# Patient Record
Sex: FEMALE | Race: BLACK OR AFRICAN AMERICAN | URBAN - METROPOLITAN AREA
[De-identification: names, ages, dates, MRNs, and addresses within clinical notes are randomized per-mention and may not be internally consistent; named-entity substitution may affect disease eponyms.]

---

## 2019-01-09 PROBLEM — 66612000 NUTRITIONAL ANEMIA: Status: ACTIVE | Noted: 2019-01-09

## 2019-01-09 PROBLEM — 274774002 ELEVATED LEVELS OF TRANSAMINASE & LACTIC ACID DEHYDROGENASE: Status: ACTIVE | Noted: 2019-01-09

## 2020-01-10 PROBLEM — 19943007 CIRRHOSIS OF LIVER: Status: ACTIVE | Noted: 2020-01-10

## 2020-01-10 PROBLEM — 14223005 ESOPHAGEAL VARICES WITHOUT BLEEDING: Status: ACTIVE | Noted: 2020-01-10

## 2021-01-01 ENCOUNTER — OFFICE VISIT (OUTPATIENT)
Dept: URBAN - METROPOLITAN AREA CLINIC 84 | Facility: CLINIC | Age: 66
End: 2021-01-01
Payer: COMMERCIAL

## 2021-01-01 ENCOUNTER — OFFICE VISIT (OUTPATIENT)
Dept: URBAN - METROPOLITAN AREA CLINIC 29 | Facility: CLINIC | Age: 66
End: 2021-01-01

## 2021-01-01 ENCOUNTER — TELEPHONE ENCOUNTER (OUTPATIENT)
Dept: URBAN - METROPOLITAN AREA CLINIC 84 | Facility: CLINIC | Age: 66
End: 2021-01-01

## 2021-01-01 ENCOUNTER — WEB ENCOUNTER (OUTPATIENT)
Dept: URBAN - METROPOLITAN AREA CLINIC 84 | Facility: CLINIC | Age: 66
End: 2021-01-01

## 2021-01-01 ENCOUNTER — TELEPHONE ENCOUNTER (OUTPATIENT)
Dept: URBAN - METROPOLITAN AREA SURGERY CENTER 30 | Facility: SURGERY CENTER | Age: 66
End: 2021-01-01

## 2021-01-01 VITALS
DIASTOLIC BLOOD PRESSURE: 73 MMHG | BODY MASS INDEX: 26.81 KG/M2 | SYSTOLIC BLOOD PRESSURE: 110 MMHG | HEART RATE: 114 BPM | TEMPERATURE: 97.2 F | HEIGHT: 67 IN | WEIGHT: 170.8 LBS

## 2021-01-01 DIAGNOSIS — R11.0 NAUSEA: ICD-10-CM

## 2021-01-01 DIAGNOSIS — R18.8 ASCITES CONTROLLED WITH MEDICATION: ICD-10-CM

## 2021-01-01 DIAGNOSIS — N17.9 ACUTE RENAL FAILURE, UNSPECIFIED ACUTE RENAL FAILURE TYPE: ICD-10-CM

## 2021-01-01 DIAGNOSIS — K92.1 GASTROINTESTINAL HEMORRHAGE WITH MELENA: ICD-10-CM

## 2021-01-01 DIAGNOSIS — K74.69 CRYPTOGENIC CIRRHOSIS: ICD-10-CM

## 2021-01-01 DIAGNOSIS — K29.00 OTHER ACUTE GASTRITIS WITHOUT HEMORRHAGE: ICD-10-CM

## 2021-01-01 PROCEDURE — 99244 OFF/OP CNSLTJ NEW/EST MOD 40: CPT | Performed by: INTERNAL MEDICINE

## 2021-01-01 RX ORDER — SITAGLIPTIN 100 MG/1
1 TABLET TABLET, FILM COATED ORAL ONCE A DAY
Status: ON HOLD | COMMUNITY

## 2021-01-01 RX ORDER — TRAMADOL HYDROCHLORIDE 50 MG/1
1 TABLET AS NEEDED TABLET, FILM COATED ORAL ONCE A DAY
Status: ON HOLD | COMMUNITY

## 2021-01-01 RX ORDER — FUROSEMIDE 40 MG/1
1 TABLET TABLET ORAL ONCE A DAY
Status: ON HOLD | COMMUNITY

## 2021-01-01 RX ORDER — OMEPRAZOLE 40 MG/1
1 CAPSULE 30 MINUTES BEFORE MORNING MEAL CAPSULE, DELAYED RELEASE ORAL ONCE A DAY
Status: ON HOLD | COMMUNITY

## 2021-01-01 RX ORDER — ANASTROZOLE 1 MG/1
1 TABLET TABLET, FILM COATED ORAL ONCE A DAY
Status: ON HOLD | COMMUNITY

## 2021-01-01 RX ORDER — SPIRONOLACTONE 100 MG/1
1 TABLET TABLET, FILM COATED ORAL ONCE A DAY
Status: ON HOLD | COMMUNITY

## 2021-01-01 RX ORDER — ONDANSETRON HYDROCHLORIDE 4 MG/1
1 TABLET TABLET, FILM COATED ORAL EVERY 8 HOURS
Qty: 40 | Refills: 2 | OUTPATIENT
Start: 2021-01-01

## 2021-01-01 RX ORDER — AMLODIPINE BESYLATE 10 MG/1
1 TABLET TABLET ORAL ONCE A DAY
Status: ON HOLD | COMMUNITY

## 2021-04-01 PROBLEM — 271832001 FLATULENCE, ERUCTATION AND GAS PAIN: Status: ACTIVE | Noted: 2021-01-01

## 2021-04-01 PROBLEM — 414916001 OBESITY: Status: ACTIVE | Noted: 2021-01-01

## 2021-08-12 NOTE — HPI-TODAY'S VISIT:
The patient was referred by Dr. Yana Nye for cirrhosis/nausea .   A copy of this document is being forwarded to the referring provider.  She was recently diagnosed with cirrhosis.  She was told this is due to "fatty liver." She denies prior heavy EtOH use.  She was told she is negative for Hep B/Hep C.  She has history of breast cancer and did get chemo for this.  She has beenhahving ascites.  Per the patient she has had multiple hospitalizations over the past few months. She was admitted for LGI bleed.  She apparently had a pill cam and then had an A-gram with embolization.  She has not had any recurrent bleeding. She was d/benitez again last week for N/V/dehydration.  She has a decreased appetite.  She has trouble chewing her food.  She has dysphagia.  She has daily nausea for about 2-3 weeks with intermittent vomiting.  She denies UGI bleed. She has some mild GERD.  She has a constant abdominal pain.  She has had a paracentesis in the past. She denies LGI symptoms.  She denies LGI bleed or melena.  She has mucus in her stool.  She is not on antiemetics.  The onnly medications she is presntly taking is the Norvasc and Januvia.  Hospital records were reviewwed extensively in the office today

## 2021-08-13 PROBLEM — 89580002: Status: ACTIVE | Noted: 2021-01-01

## 2021-08-13 PROBLEM — 389026000: Status: ACTIVE | Noted: 2021-01-01

## 2021-09-27 ENCOUNTER — DASHBOARD ENCOUNTERS (OUTPATIENT)
Age: 66
End: 2021-09-27

## 2021-09-30 ENCOUNTER — OFFICE VISIT (OUTPATIENT)
Dept: URBAN - METROPOLITAN AREA CLINIC 84 | Facility: CLINIC | Age: 66
End: 2021-09-30

## 2021-09-30 RX ORDER — TRAMADOL HYDROCHLORIDE 50 MG/1
1 TABLET AS NEEDED TABLET, FILM COATED ORAL ONCE A DAY
COMMUNITY

## 2021-09-30 RX ORDER — SITAGLIPTIN 100 MG/1
1 TABLET TABLET, FILM COATED ORAL ONCE A DAY
COMMUNITY

## 2021-09-30 RX ORDER — AMLODIPINE BESYLATE 10 MG/1
1 TABLET TABLET ORAL ONCE A DAY
COMMUNITY

## 2021-09-30 RX ORDER — FUROSEMIDE 40 MG/1
1 TABLET TABLET ORAL ONCE A DAY
COMMUNITY

## 2021-09-30 RX ORDER — OMEPRAZOLE 40 MG/1
1 CAPSULE 30 MINUTES BEFORE MORNING MEAL CAPSULE, DELAYED RELEASE ORAL ONCE A DAY
COMMUNITY

## 2021-09-30 RX ORDER — SPIRONOLACTONE 100 MG/1
1 TABLET TABLET, FILM COATED ORAL ONCE A DAY
COMMUNITY

## 2021-09-30 RX ORDER — ANASTROZOLE 1 MG/1
1 TABLET TABLET, FILM COATED ORAL ONCE A DAY
COMMUNITY

## 2021-09-30 RX ORDER — ONDANSETRON HYDROCHLORIDE 4 MG/1
1 TABLET TABLET, FILM COATED ORAL EVERY 8 HOURS
Qty: 40 | Refills: 2 | COMMUNITY
Start: 2021-01-01

## 2022-04-30 ENCOUNTER — TELEPHONE ENCOUNTER (OUTPATIENT)
Dept: URBAN - METROPOLITAN AREA CLINIC 121 | Facility: CLINIC | Age: 67
End: 2022-04-30

## 2022-04-30 RX ORDER — NADOLOL 20 MG/1
TAKE 1 TABLET PO QD TABLET ORAL
OUTPATIENT
Start: 2019-02-01

## 2022-04-30 RX ORDER — NADOLOL 40 MG/1
TAKE 1 TABLET PO QD TABLET ORAL
OUTPATIENT
Start: 2019-02-01

## 2022-04-30 RX ORDER — DOXYCYCLINE HYCLATE 100 MG/1
TAKE 1 TABLET WITH DINNER FOR 14 DAYS TABLET ORAL
OUTPATIENT
Start: 2021-01-01 | End: 2021-01-01

## 2022-04-30 RX ORDER — OMEPRAZOLE 40 MG/1
1 CAPSULE PO BID X 14 DAYS CAPSULE, DELAYED RELEASE ORAL
OUTPATIENT
Start: 2021-01-01 | End: 2021-01-01

## 2022-04-30 RX ORDER — LEVOFLOXACIN 250 MG
1 TABLET PO QAM X 14 DAYS TABLET ORAL
OUTPATIENT
Start: 2021-01-01 | End: 2021-01-01

## 2022-04-30 RX ORDER — NITAZOXANIDE 500 MG/1
1 TABLET PO BID X 14 DAYS TABLET ORAL
OUTPATIENT
Start: 2021-01-01 | End: 2021-01-01

## 2022-05-01 ENCOUNTER — TELEPHONE ENCOUNTER (OUTPATIENT)
Dept: URBAN - METROPOLITAN AREA CLINIC 121 | Facility: CLINIC | Age: 67
End: 2022-05-01

## 2022-05-01 RX ORDER — NADOLOL 40 MG/1
TAKE 1 TABLET BY MOUTH EVERY DAY TABLET ORAL
Status: ACTIVE | COMMUNITY
Start: 2019-07-18

## 2022-05-01 RX ORDER — AMLODIPINE BESYLATE 2.5 MG/1
TABLET ORAL
Status: ACTIVE | COMMUNITY
Start: 2019-01-09

## 2022-05-01 RX ORDER — SITAGLIPTIN PHOSPHATE 100 MG
TABLET ORAL
Status: ACTIVE | COMMUNITY
Start: 2021-01-01

## 2022-05-01 RX ORDER — OMEPRAZOLE 40 MG/1
1 CAPSULE PO QAM CAPSULE, DELAYED RELEASE ORAL
Status: ACTIVE | COMMUNITY
Start: 2019-02-01

## 2022-05-01 RX ORDER — ANASTROZOLE 1 MG/1
DAILY TABLET ORAL
Status: ACTIVE | COMMUNITY
Start: 2019-02-01

## 2023-03-05 NOTE — PHYSICAL EXAM GASTROINTESTINAL
Abdomen , soft, nontender, nondistended , no guarding or rigidity , no masses palpable , normal bowel sounds , Liver and Spleen , no hepatomegaly present , no hepatosplenomegaly , liver nontender , spleen not palpable  , epigastrium tenderness , ascites questionable - - -